# Patient Record
(demographics unavailable — no encounter records)

---

## 2025-02-19 NOTE — PHYSICAL EXAM
[General Appearance - Alert] : alert [General Appearance - In No Acute Distress] : in no acute distress [General Appearance - Well Nourished] : well nourished [Sclera] : the sclera and conjunctiva were normal [Jugular Venous Distention Increased] : there was no jugular-venous distention [] : no respiratory distress [Respiration, Rhythm And Depth] : normal respiratory rhythm and effort [Auscultation Breath Sounds / Voice Sounds] : lungs were clear to auscultation bilaterally [Heart Rate And Rhythm] : heart rate was normal and rhythm regular [Heart Sounds] : normal S1 and S2 [Edema] : there was no peripheral edema [Abdomen Soft] : soft [Abdomen Tenderness] : non-tender [Abnormal Walk] : normal gait [Involuntary Movements] : no involuntary movements were seen [Skin Color & Pigmentation] : normal skin color and pigmentation [No Focal Deficits] : no focal deficits [Oriented To Time, Place, And Person] : oriented to person, place, and time [Impaired Insight] : insight and judgment were intact [Affect] : the affect was normal

## 2025-02-20 NOTE — REVIEW OF SYSTEMS
[As Noted in HPI] : as noted in HPI [Negative] : Musculoskeletal [Fever] : no fever [Chills] : no chills [Lower Ext Edema] : no lower extremity edema [Shortness Of Breath] : no shortness of breath [Wheezing] : no wheezing [Cough] : no cough [Orthopnea] : no orthopnea

## 2025-02-20 NOTE — END OF VISIT
[] : Fellow [FreeTextEntry3] : CKD 3 b no  proteinuria HTN better sec HPT mild. vitamin d normal. stay off vitamin D   Otherwise, detailed plan and management as outlined above by the renal fellow Dr. Bryn Galvez  [Time Spent: ___ minutes] : I have spent [unfilled] minutes of time on the encounter which excludes teaching and separately reported services.

## 2025-02-20 NOTE — ASSESSMENT
[FreeTextEntry1] : Pt. w/ PMHx of HLD, CKD3 and Hx. of Transitional Cell CA s/p ureteronephrectomy   #CKD Stage III likely in the setting of ureteronephrectomy -stable - Previously Creatinine cr 1.32 eGFR 42 --> Cr 1.35; eGFR 41 (2/19)  -Will monitor    #HTN -improving -Cw low salt diet -Cw exercise - Amlodipine 5mg  #HLD -Previously had CT A/p w cont showing severe aortic atherosclerotic disease > saw cards > increased Crestor by cards   #TCC s/p Left Ureteronephrectomy -following urology closely  #Vitamin D level is High - now normal level -Keep off vitamin D    2/20: Called and spoke with patient about labs done on 2/19. Plan as above  RTC 6 months